# Patient Record
Sex: MALE | Race: WHITE | Employment: FULL TIME | URBAN - NONMETROPOLITAN AREA
[De-identification: names, ages, dates, MRNs, and addresses within clinical notes are randomized per-mention and may not be internally consistent; named-entity substitution may affect disease eponyms.]

---

## 2023-01-23 ENCOUNTER — OFFICE VISIT (OUTPATIENT)
Dept: PRIMARY CARE CLINIC | Age: 58
End: 2023-01-23

## 2023-01-23 VITALS
HEART RATE: 71 BPM | SYSTOLIC BLOOD PRESSURE: 116 MMHG | DIASTOLIC BLOOD PRESSURE: 74 MMHG | HEIGHT: 70 IN | OXYGEN SATURATION: 98 % | BODY MASS INDEX: 36.94 KG/M2 | WEIGHT: 258 LBS | RESPIRATION RATE: 16 BRPM

## 2023-01-23 DIAGNOSIS — Z76.89 RETURN TO WORK EXAM: ICD-10-CM

## 2023-01-23 DIAGNOSIS — I50.22 CHRONIC SYSTOLIC HEART FAILURE (HCC): Primary | ICD-10-CM

## 2023-01-23 DIAGNOSIS — I42.0 PRIMARY DILATED CARDIOMYOPATHY (HCC): ICD-10-CM

## 2023-01-23 DIAGNOSIS — Z95.810 ICD (IMPLANTABLE CARDIOVERTER-DEFIBRILLATOR) IN PLACE: ICD-10-CM

## 2023-01-23 DIAGNOSIS — I44.7 COMPLETE LEFT BUNDLE BRANCH BLOCK: ICD-10-CM

## 2023-01-23 RX ORDER — FUROSEMIDE 40 MG/1
40 TABLET ORAL DAILY
COMMUNITY
Start: 2022-04-18

## 2023-01-23 RX ORDER — POTASSIUM CHLORIDE 20 MEQ/1
20 TABLET, EXTENDED RELEASE ORAL DAILY
COMMUNITY
Start: 2022-08-03

## 2023-01-23 RX ORDER — SACUBITRIL AND VALSARTAN 49; 51 MG/1; MG/1
1 TABLET, FILM COATED ORAL 2 TIMES DAILY
COMMUNITY

## 2023-01-23 RX ORDER — SILDENAFIL 100 MG/1
100 TABLET, FILM COATED ORAL PRN
COMMUNITY

## 2023-01-23 RX ORDER — CARVEDILOL 6.25 MG/1
6.25 TABLET ORAL
COMMUNITY
Start: 2022-04-14

## 2023-01-23 ASSESSMENT — ENCOUNTER SYMPTOMS
SHORTNESS OF BREATH: 0
CHEST TIGHTNESS: 0

## 2023-01-23 NOTE — PATIENT INSTRUCTIONS
15# Lift with left arm max, no over shoulder lifting through end of day 1/27/2023  Pt will need to return am 1/30/23 to demonstrate full capacity of Left arm use unless provider extends restrictions or restrictions needed extended.

## 2023-01-23 NOTE — LETTER
[address]  Phone: [phone]  Fax: [phone]    Munira DecemberSYDNEY - JOSE MARIA        January 23, 2023     Patient: Gogo Long   YOB: 1965   Date of Visit: 1/23/2023       To Whom It May Concern: It is my medical opinion that Gogo Long may return to work on 1/23/2023 with the following restrictions: lifting/carrying not to exceed 15# with left arm and nothing above left shoulder through end of day 1/27/2023 lbs. Additionally with his BWC injury no kneeling, no squatting, crawling or stairs for his knee injury. Pending evaluation with [facility] provider. If you have any questions or concerns, please don't hesitate to call.     Sincerely,        Munira DecemberSYDNEY - JOSE MARIA

## 2023-01-23 NOTE — PROGRESS NOTES
[address]  Dept: [phone]  Dept Fax: [phone]: [phone]  Loc Fax: [phone]    Ai Pascual is a 59 y.o. male who presents today for his medical conditions/complaints as noted below. Chief Complaint   Patient presents with    Other     Return to work physical, no more than 15 lbs lifting until 01/27/2023. HPI:     Chase Bryant is here to perform a RTW evaluation  He had ICD implanted and has restrictions through 1/27/2023  Pt also has a  knee injury and is being followed through [facility] for [address]     Pt denies any CP, SOB: He has lifting restrictions with left arm: no lifting over shoulder or > 15#      Other  Associated symptoms include myalgias. Pertinent negatives include no chest pain, congestion or headaches. Current Outpatient Medications   Medication Sig Dispense Refill    sildenafil (VIAGRA) 100 MG tablet Take 100 mg by mouth as needed      potassium chloride (KLOR-CON M) 20 MEQ extended release tablet Take 20 mEq by mouth daily      furosemide (LASIX) 40 MG tablet Take 40 mg by mouth daily      carvedilol (COREG) 6.25 MG tablet Take 6.25 mg by mouth      apixaban (ELIQUIS) 5 MG TABS tablet Take 5 mg by mouth 2 times daily      sacubitril-valsartan (ENTRESTO) 49-51 MG per tablet Take 1 tablet by mouth 2 times daily       No current facility-administered medications for this visit. Allergies   Allergen Reactions    Ampicillin        Subjective:      Review of Systems   Constitutional: Negative. HENT:  Negative for congestion. Respiratory:  Negative for chest tightness and shortness of breath. Cardiovascular:  Negative for chest pain and leg swelling. Musculoskeletal:  Positive for myalgias. Skin:  Positive for wound. Neurological:  Negative for dizziness, light-headedness and headaches.      Objective:     /74 (Site: Left Upper Arm, Position: Sitting, Cuff Size: Large Adult)   Pulse 71   Resp 16   Ht 5' 10\" (1.778 m)   Wt 258 lb (117 kg)   SpO2 98%   BMI 37.02 kg/m²     Physical Exam  Constitutional:       Appearance: Normal appearance. He is not ill-appearing or diaphoretic. HENT:      Head: Normocephalic. Cardiovascular:      Rate and Rhythm: Normal rate. Pulses: Normal pulses. Pulmonary:      Effort: Pulmonary effort is normal.   Chest:          Comments: Healing scar from ICD implant  No ecchymosis no drainage or sings of complication    Musculoskeletal:         General: Swelling and tenderness present. Skin:     General: Skin is warm and dry. Capillary Refill: Capillary refill takes less than 2 seconds. Neurological:      Mental Status: He is alert. Assessment:      1. Chronic systolic heart failure ([address]    2. Primary dilated cardiomyopathy ([address]    3. Complete left bundle branch block    4. ICD (implantable cardioverter-defibrillator) in place  Healing: restrictions for lifting and no over shoulder work per Dr. Maryuri Horner    5. Return to work exam  Completed: pt was able to perform activities related to accommodated work role with restrictions. See pt back 1/30/23 as Dr. Maryuri Horner has lifted restrictions end of day 1/27/23  Eduction for CHF provided to pt with regards to meds and diet with reinforcement        Plan:   Per reviw of records:  Mr Sunil Quick may return to work on 1/23/2023 with the following restrictions: lifting/carrying not to exceed 15# with left arm and nothing above left shoulder through end of day 1/27/2023 lbs. Additionally with his BWC injury no kneeling, no squatting, crawling or stairs for his knee injury. Pending evaluation with [facility] provider. Follow up 1/30/23 to evaluate the use of left upper extremity. Discussed use, benefit, and side effects of prescribed medications. Barriers to medication compliance addressed. All patient questions answered. Pt voiced understanding.      Return in about 1 week (around 1/30/2023).     Form for work role accommodation evaluation scan to Dariel Juarez  Performed with AT onsite       Electronically signed by SYDNEY Lopez CNP on 1/23/2023 at 11:30 AM

## 2023-01-30 ENCOUNTER — OFFICE VISIT (OUTPATIENT)
Dept: PRIMARY CARE CLINIC | Age: 58
End: 2023-01-30

## 2023-01-30 VITALS
BODY MASS INDEX: 36.94 KG/M2 | OXYGEN SATURATION: 98 % | HEART RATE: 88 BPM | WEIGHT: 258 LBS | RESPIRATION RATE: 18 BRPM | HEIGHT: 70 IN

## 2023-01-30 DIAGNOSIS — Z76.89 RETURN TO WORK EXAM: Primary | ICD-10-CM

## 2023-01-30 ASSESSMENT — ENCOUNTER SYMPTOMS
CHEST TIGHTNESS: 0
SHORTNESS OF BREATH: 0

## 2023-01-30 NOTE — PROGRESS NOTES
[address]  Dept: [phone]  Dept Fax: : [phone]  Loc Fax: [phone]    Sharee Varghese is a 59 y.o. male who presents today for his medical conditions/complaints as noted below. Chief Complaint   Patient presents with    Other     Here for follow up for return to work physical, full duty, restrictions fall off today from cardiologist.        HPI:     Angle Kohler is here for follow up following a RTW evaluation 1/23/2023  He had ICD implanted and has restrictions were approved through 1/27/2023  Pt also has a  knee injury and is being followed through [facility] for [facility], missed appt Friday rescheduled for this Friday      Pt denies any CP, SOB: He has lifting restrictions with left arm: no lifting over shoulder or > 15#      Other  Associated symptoms include myalgias. Pertinent negatives include no chest pain, congestion or headaches. Current Outpatient Medications   Medication Sig Dispense Refill    sildenafil (VIAGRA) 100 MG tablet Take 100 mg by mouth as needed      potassium chloride (KLOR-CON M) 20 MEQ extended release tablet Take 20 mEq by mouth daily      furosemide (LASIX) 40 MG tablet Take 40 mg by mouth daily      carvedilol (COREG) 6.25 MG tablet Take 6.25 mg by mouth      apixaban (ELIQUIS) 5 MG TABS tablet Take 5 mg by mouth 2 times daily      sacubitril-valsartan (ENTRESTO) 49-51 MG per tablet Take 1 tablet by mouth 2 times daily       No current facility-administered medications for this visit. Allergies   Allergen Reactions    Ampicillin        Subjective:      Review of Systems   Constitutional: Negative. HENT:  Negative for congestion. Respiratory:  Negative for chest tightness and shortness of breath. Cardiovascular:  Negative for chest pain and leg swelling. Musculoskeletal:  Positive for myalgias. Skin:  Positive for wound.    Neurological: Negative for dizziness, light-headedness and headaches. Objective:     Pulse 88   Resp 18   Ht 5' 10\" (1.778 m)   Wt 258 lb (117 kg)   SpO2 98%   BMI 37.02 kg/m²     Physical Exam  Constitutional:       Appearance: Normal appearance. He is not ill-appearing or diaphoretic. HENT:      Head: Normocephalic. Cardiovascular:      Rate and Rhythm: Normal rate. Pulses: Normal pulses. Pulmonary:      Effort: Pulmonary effort is normal.   Chest:          Comments: Healing scar from ICD implant  No ecchymosis no drainage or sings of complication    Musculoskeletal:         General: Swelling and tenderness present. Skin:     General: Skin is warm and dry. Capillary Refill: Capillary refill takes less than 2 seconds. Neurological:      Mental Status: He is alert. Assessment:      1. Chronic systolic heart failure ([address]    2. Primary dilated cardiomyopathy ([address]    3. Complete left bundle branch block    4. ICD (implantable cardioverter-defibrillator) in place  Healing: restrictions for lifting and no over shoulder work per Dr. Kacy Amin    5. Return to work exam  Completed: pt was able to perform activities related to accommodated work role with restrictions. See pt back 1/30/23 as Dr. Kacy Amin has lifted restrictions end of day 1/27/23  Eduction for CHF provided to pt with regards to meds and diet with reinforcement        Plan:   Per reviw of records:  Mr Shalini Saleh may return to work on 1/23/2023 with the following restrictions: lifting/carrying not to exceed 15# with left arm and nothing above left shoulder through end of day 1/27/2023 lbs. Additionally with his BWC injury no kneeling, no squatting, crawling or stairs for his knee injury. Pending evaluation with [facility] provider. Follow up 12/6/2023 to evaluate the use of left upper extremity. Discussed use, benefit, and side effects of prescribed medications. Barriers to medication compliance addressed. All patient questions answered. Pt voiced understanding. Return in about 1 week (around 2/6/2023).     Form for work role accommodation evaluation scan to Dariel Juarez  Performed with AT onsite       Electronically signed by SYDNEY Ramsey CNP on 2/17/2023 at 10:57 AM

## 2023-01-30 NOTE — LETTER
[address]  Phone: [phone]  Fax: [phone]    SYDNEY Villagran CNP        January 30, 2023     Patient: Stephen Yusuf   YOB: 1965   Date of Visit: 1/30/2023       To Whom It May Concern: It is my medical opinion that Stephen Yusuf It is my medical opinion that Stephen Yusuf may return to work on 1/30/2023 with the following restrictions: lifting/carrying not to exceed 15# with left arm and nothing above left shoulder through end of day 2/3/2023. Additionally with his BWC injury no kneeling, no squatting, crawling or stairs for his knee injury. Pending evaluation with [facility] provider. .    If you have any questions or concerns, please don't hesitate to call.     Sincerely,        SYDNEY Villagran CNP

## 2023-01-30 NOTE — Clinical Note
[address]  Phone: [phone]  Fax: [phone]    SYDNEY Álvarez CNP        January 30, 2023     Patient: Alfred Joiner   YOB: 1965   Date of Visit: 1/30/2023       To Whom It May Concern: It is my medical opinion that Alfred Joiner {Work release (duty restriction):19248}. If you have any questions or concerns, please don't hesitate to call.     Sincerely,        SYDNEY Álvarez CNP

## 2023-01-30 NOTE — PATIENT INSTRUCTIONS
See Cardiology: discuss fatigue, cough  Ask about Cardiac Rehab  Irregular HR  Follow up with me Monday for left arm restrictions

## 2023-02-06 ENCOUNTER — OFFICE VISIT (OUTPATIENT)
Dept: PRIMARY CARE CLINIC | Age: 58
End: 2023-02-06

## 2023-02-06 VITALS
OXYGEN SATURATION: 96 % | WEIGHT: 258 LBS | HEART RATE: 53 BPM | BODY MASS INDEX: 36.94 KG/M2 | HEIGHT: 70 IN | RESPIRATION RATE: 16 BRPM

## 2023-02-06 DIAGNOSIS — Z76.89 RETURN TO WORK EXAM: Primary | ICD-10-CM

## 2023-02-06 ASSESSMENT — ENCOUNTER SYMPTOMS
SHORTNESS OF BREATH: 0
CHEST TIGHTNESS: 0

## 2023-02-06 NOTE — PROGRESS NOTES
[address]  Dept: [phone]  Dept Fax: [phone]: [phone]  Loc Fax: [phone]    Gee Arzate is a 59 y.o. male who presents today for his medical conditions/complaints as noted below. Chief Complaint   Patient presents with    Other     Follow up for work restrictions. Seen [facility] on 02/03/2023. Patient is being sent for second opinion with Ortho. HPI:     Ryanne Gallegos is here for follow up following a RTW evaluation 1/23/2023  He had ICD implanted and has restrictions were approved through 1/27/2023  Pt also has a  knee injury and is being followed through [facility] for [facility], missed appt Friday rescheduled for this Friday      Pt denies any CP, SOB: He has lifting restrictions with left arm: no lifting over shoulder or > 15#      Other  Associated symptoms include myalgias. Pertinent negatives include no chest pain, congestion or headaches. Current Outpatient Medications   Medication Sig Dispense Refill    sildenafil (VIAGRA) 100 MG tablet Take 100 mg by mouth as needed      potassium chloride (KLOR-CON M) 20 MEQ extended release tablet Take 20 mEq by mouth daily      furosemide (LASIX) 40 MG tablet Take 40 mg by mouth daily      carvedilol (COREG) 6.25 MG tablet Take 6.25 mg by mouth      apixaban (ELIQUIS) 5 MG TABS tablet Take 5 mg by mouth 2 times daily      sacubitril-valsartan (ENTRESTO) 49-51 MG per tablet Take 1 tablet by mouth 2 times daily       No current facility-administered medications for this visit. Allergies   Allergen Reactions    Ampicillin        Subjective:      Review of Systems   Constitutional: Negative. HENT:  Negative for congestion. Respiratory:  Negative for chest tightness and shortness of breath. Cardiovascular:  Negative for chest pain and leg swelling. Musculoskeletal:  Positive for myalgias. Skin:  Positive for wound. Neurological:  Negative for dizziness, light-headedness and headaches. Objective:     Pulse 53   Resp 16   Ht 5' 10\" (1.778 m)   Wt 258 lb (117 kg)   SpO2 96%   BMI 37.02 kg/m²     Physical Exam  Constitutional:       Appearance: Normal appearance. He is not ill-appearing or diaphoretic. HENT:      Head: Normocephalic. Cardiovascular:      Rate and Rhythm: Normal rate. Pulses: Normal pulses. Pulmonary:      Effort: Pulmonary effort is normal.   Chest:          Comments: Healing scar from ICD implant  No ecchymosis no drainage or sings of complication    Musculoskeletal:         General: Swelling and tenderness present. Skin:     General: Skin is warm and dry. Capillary Refill: Capillary refill takes less than 2 seconds. Neurological:      Mental Status: He is alert. Assessment:      1. Chronic systolic heart failure ([address]    2. Primary dilated cardiomyopathy ([address]    3. Complete left bundle branch block    4. ICD (implantable cardioverter-defibrillator) in place  Healing: restrictions for lifting and no over shoulder work per Dr. Ramon Ovalle    5. Return to work exam  Completed: pt was able to perform activities related to accommodated work role with restrictions. Plan:   Per reviw of records:  Mr Martin Byrd return ed to work on 1/23/2023 with the following restrictions: lifting/carrying not to exceed 15# with left arm and nothing above left shoulder through end of day 1/27/2023 lbs. Additionally with his BWC injury no kneeling, no squatting, crawling or stairs for his knee injury. Pending evaluation with [facility] provider for his knee      Discussed use, benefit, and side effects of prescribed medications. Barriers to medication compliance addressed. All patient questions answered. Pt voiced understanding. No follow-ups on file.          Electronically signed by SYDNEY Villagran CNP on 2/6/2023 at 1:08 PM

## 2023-02-06 NOTE — LETTER
[address]  Phone: [phone]  Fax: [phone], APRN - CNP        February 6, 2023     Patient: Mariel Davidson   YOB: 1965   Date of Visit: 2/6/2023       To Whom It May Concern: It is my medical opinion that Mariel [place]field no lifting with left arm > 40# [place] in 6 weeks. If you have any questions or concerns, please don't hesitate to call.     Sincerely,        Georgianna Spurling, SYDNEY - CNP